# Patient Record
Sex: FEMALE | Race: BLACK OR AFRICAN AMERICAN | NOT HISPANIC OR LATINO | Employment: FULL TIME | URBAN - METROPOLITAN AREA
[De-identification: names, ages, dates, MRNs, and addresses within clinical notes are randomized per-mention and may not be internally consistent; named-entity substitution may affect disease eponyms.]

---

## 2023-12-08 ENCOUNTER — TELEPHONE (OUTPATIENT)
Dept: OBSTETRICS AND GYNECOLOGY | Facility: HOSPITAL | Age: 28
End: 2023-12-08
Payer: MEDICAID

## 2023-12-13 NOTE — TELEPHONE ENCOUNTER
Patient called office for prescription request  Called patient back to follow up, vm box not set up, tried contacting patient again and phone number was out of service  Not connected with MyChart  Will await to here back from patient  Flora Crawford RN

## 2024-10-20 ENCOUNTER — HOSPITAL ENCOUNTER (EMERGENCY)
Facility: HOSPITAL | Age: 29
Discharge: HOME | End: 2024-10-20
Attending: EMERGENCY MEDICINE
Payer: MEDICAID

## 2024-10-20 VITALS
SYSTOLIC BLOOD PRESSURE: 107 MMHG | TEMPERATURE: 97.3 F | HEART RATE: 55 BPM | DIASTOLIC BLOOD PRESSURE: 78 MMHG | WEIGHT: 120 LBS | RESPIRATION RATE: 16 BRPM | BODY MASS INDEX: 23.56 KG/M2 | HEIGHT: 60 IN | OXYGEN SATURATION: 98 %

## 2024-10-20 DIAGNOSIS — F10.920 ALCOHOLIC INTOXICATION WITHOUT COMPLICATION (CMS-HCC): Primary | ICD-10-CM

## 2024-10-20 LAB
ALBUMIN SERPL BCP-MCNC: 3.7 G/DL (ref 3.4–5)
ALP SERPL-CCNC: 41 U/L (ref 33–110)
ALT SERPL W P-5'-P-CCNC: 13 U/L (ref 7–45)
ANION GAP SERPL CALC-SCNC: 19 MMOL/L (ref 10–20)
AST SERPL W P-5'-P-CCNC: 27 U/L (ref 9–39)
BASOPHILS # BLD AUTO: 0.04 X10*3/UL (ref 0–0.1)
BASOPHILS NFR BLD AUTO: 0.5 %
BILIRUB SERPL-MCNC: 0.4 MG/DL (ref 0–1.2)
BUN SERPL-MCNC: 10 MG/DL (ref 6–23)
CALCIUM SERPL-MCNC: 8.1 MG/DL (ref 8.6–10.6)
CHLORIDE SERPL-SCNC: 107 MMOL/L (ref 98–107)
CO2 SERPL-SCNC: 18 MMOL/L (ref 21–32)
CREAT SERPL-MCNC: 0.81 MG/DL (ref 0.5–1.05)
EGFRCR SERPLBLD CKD-EPI 2021: >90 ML/MIN/1.73M*2
EOSINOPHIL # BLD AUTO: 0.09 X10*3/UL (ref 0–0.7)
EOSINOPHIL NFR BLD AUTO: 1.2 %
ERYTHROCYTE [DISTWIDTH] IN BLOOD BY AUTOMATED COUNT: 14.3 % (ref 11.5–14.5)
ETHANOL SERPL-MCNC: 124 MG/DL
GLUCOSE BLD MANUAL STRIP-MCNC: 123 MG/DL (ref 74–99)
GLUCOSE SERPL-MCNC: 60 MG/DL (ref 74–99)
HCT VFR BLD AUTO: 37.1 % (ref 36–46)
HGB BLD-MCNC: 11.2 G/DL (ref 12–16)
IMM GRANULOCYTES # BLD AUTO: 0.02 X10*3/UL (ref 0–0.7)
IMM GRANULOCYTES NFR BLD AUTO: 0.3 % (ref 0–0.9)
LYMPHOCYTES # BLD AUTO: 2.56 X10*3/UL (ref 1.2–4.8)
LYMPHOCYTES NFR BLD AUTO: 33.2 %
MCH RBC QN AUTO: 30.9 PG (ref 26–34)
MCHC RBC AUTO-ENTMCNC: 30.2 G/DL (ref 32–36)
MCV RBC AUTO: 102 FL (ref 80–100)
MONOCYTES # BLD AUTO: 0.48 X10*3/UL (ref 0.1–1)
MONOCYTES NFR BLD AUTO: 6.2 %
NEUTROPHILS # BLD AUTO: 4.52 X10*3/UL (ref 1.2–7.7)
NEUTROPHILS NFR BLD AUTO: 58.6 %
NRBC BLD-RTO: 0 /100 WBCS (ref 0–0)
PLATELET # BLD AUTO: 227 X10*3/UL (ref 150–450)
POTASSIUM SERPL-SCNC: 3.3 MMOL/L (ref 3.5–5.3)
PROT SERPL-MCNC: 6.5 G/DL (ref 6.4–8.2)
RBC # BLD AUTO: 3.63 X10*6/UL (ref 4–5.2)
SODIUM SERPL-SCNC: 141 MMOL/L (ref 136–145)
WBC # BLD AUTO: 7.7 X10*3/UL (ref 4.4–11.3)

## 2024-10-20 PROCEDURE — 85025 COMPLETE CBC W/AUTO DIFF WBC: CPT

## 2024-10-20 PROCEDURE — 2500000004 HC RX 250 GENERAL PHARMACY W/ HCPCS (ALT 636 FOR OP/ED): Mod: SE

## 2024-10-20 PROCEDURE — 82077 ASSAY SPEC XCP UR&BREATH IA: CPT

## 2024-10-20 PROCEDURE — 99284 EMERGENCY DEPT VISIT MOD MDM: CPT | Mod: 25

## 2024-10-20 PROCEDURE — 36415 COLL VENOUS BLD VENIPUNCTURE: CPT

## 2024-10-20 PROCEDURE — 80053 COMPREHEN METABOLIC PANEL: CPT

## 2024-10-20 PROCEDURE — 82947 ASSAY GLUCOSE BLOOD QUANT: CPT

## 2024-10-20 PROCEDURE — 96374 THER/PROPH/DIAG INJ IV PUSH: CPT

## 2024-10-20 PROCEDURE — 99284 EMERGENCY DEPT VISIT MOD MDM: CPT | Performed by: EMERGENCY MEDICINE

## 2024-10-20 PROCEDURE — 2500000002 HC RX 250 W HCPCS SELF ADMINISTERED DRUGS (ALT 637 FOR MEDICARE OP, ALT 636 FOR OP/ED): Mod: SE | Performed by: EMERGENCY MEDICINE

## 2024-10-20 RX ORDER — ONDANSETRON HYDROCHLORIDE 2 MG/ML
4 INJECTION, SOLUTION INTRAVENOUS ONCE
Status: COMPLETED | OUTPATIENT
Start: 2024-10-20 | End: 2024-10-20

## 2024-10-20 RX ORDER — POTASSIUM CHLORIDE 20 MEQ/1
40 TABLET, EXTENDED RELEASE ORAL ONCE
Status: COMPLETED | OUTPATIENT
Start: 2024-10-20 | End: 2024-10-20

## 2024-10-20 ASSESSMENT — LIFESTYLE VARIABLES
HAVE PEOPLE ANNOYED YOU BY CRITICIZING YOUR DRINKING: NO
TOTAL SCORE: 0
EVER FELT BAD OR GUILTY ABOUT YOUR DRINKING: NO
EVER HAD A DRINK FIRST THING IN THE MORNING TO STEADY YOUR NERVES TO GET RID OF A HANGOVER: NO
HAVE YOU EVER FELT YOU SHOULD CUT DOWN ON YOUR DRINKING: NO

## 2024-10-20 ASSESSMENT — COLUMBIA-SUICIDE SEVERITY RATING SCALE - C-SSRS
1. IN THE PAST MONTH, HAVE YOU WISHED YOU WERE DEAD OR WISHED YOU COULD GO TO SLEEP AND NOT WAKE UP?: NO
6. HAVE YOU EVER DONE ANYTHING, STARTED TO DO ANYTHING, OR PREPARED TO DO ANYTHING TO END YOUR LIFE?: NO
2. HAVE YOU ACTUALLY HAD ANY THOUGHTS OF KILLING YOURSELF?: NO

## 2024-10-20 ASSESSMENT — PAIN - FUNCTIONAL ASSESSMENT: PAIN_FUNCTIONAL_ASSESSMENT: 0-10

## 2024-10-20 ASSESSMENT — PAIN SCALES - GENERAL: PAINLEVEL_OUTOF10: 0 - NO PAIN

## 2024-10-20 NOTE — ED PROVIDER NOTES
HPI   No chief complaint on file.      HPI   Patient is a 28-year-old female with no significant past medical history who presents to the emergency department via EMS for hypotension and alcohol intoxication.  Patient states that she called EMS herself after she became wobbly on her feet and want to come into the emergency department as she had to work today.  Patient states that she drank a bottle of tequila overnight and smoked some marijuana 3 to 4 hours prior to presentation.  EMS states that patient was hypotensive during transport to the emergency department and was started on fluids.  They  state that the patient was responsive to the fluids and her blood pressure normalized prior to arrival.  They also state the patient vomited twice andwas bradycardic in the 50s but otherwise stable and cooperative.  Patient states that she has no other medical issues and is on no medication at baseline and denies any allergies.  Patient states that she is still a little nauseous and does not feel like she could walk steadily yet.  However, she denies chest pain, shortness of breath, headache, fevers, change in urination bowel habits.      Patient History   Past Medical History:   Diagnosis Date    Postpartum depression 03/27/2019    Depressed mood with postpartum onset     Past Surgical History:   Procedure Laterality Date    OTHER SURGICAL HISTORY  03/27/2019    No history of surgery     No family history on file.  Social History     Tobacco Use    Smoking status: Not on file    Smokeless tobacco: Not on file   Substance Use Topics    Alcohol use: Not on file    Drug use: Not on file       Physical Exam   ED Triage Vitals   Temp Pulse Resp BP   -- -- -- --      SpO2 Temp src Heart Rate Source Patient Position   -- -- -- --      BP Location FiO2 (%)     -- --       Physical Exam  Vitals and nursing note reviewed.   Constitutional:       General: She is not in acute distress.     Appearance: She is well-developed.   HENT:       Head: Normocephalic and atraumatic.   Eyes:      Conjunctiva/sclera: Conjunctivae normal.   Cardiovascular:      Rate and Rhythm: Normal rate and regular rhythm.      Heart sounds: No murmur heard.  Pulmonary:      Effort: Pulmonary effort is normal. No respiratory distress.      Breath sounds: Normal breath sounds.   Abdominal:      Palpations: Abdomen is soft.      Tenderness: There is no abdominal tenderness.   Musculoskeletal:         General: No swelling.      Cervical back: Neck supple.   Skin:     General: Skin is warm and dry.      Capillary Refill: Capillary refill takes less than 2 seconds.   Neurological:      Mental Status: She is alert.   Psychiatric:         Mood and Affect: Mood normal.     ED Course & MDM   Diagnoses as of 10/20/24 1043   Alcoholic intoxication without complication (CMS-[facility])     Medical Decision Making  Patient is a 28-year-old female with no significant past medical history who presents to the emergency department via EMS for hypotension and alcohol intoxication.  Patient was bradycardic with a heart rate of 48 bpm with other vital stable within normal limits.  Patient CBC showed no evidence of leukocytosis with a WBC of 7.7 and a slight anemia with hemoglobin of 11.2.  Patient CMP showed potassium to be slightly low at 3.3, likely due to multiple bouts of emesis.  Patient was noted to be hypoglycemic at 60.  Patient given thiamine 500 mg and tolerated oral intake.  Patient's repeat glucose resulted at 123.  Upon reevaluation at 1430, patient was sober and had tolerated p.o. food and drink.  Patient also able to ambulate to the bathroom and back and around her room without difficulty.  Patient states that she can arrange for a safe ride home.  Patient discharged in good condition with strict return precautions.  Patient understood and was agreeable with the plan.    Procedure  Procedures none     Dimitry Davis,   Resident  10/20/24 5775

## 2024-10-20 NOTE — DISCHARGE INSTRUCTIONS
You were seen in the [facility] apartment today following alcohol intoxication.  You are safely discharged at this time.  However, should you have any new, worsening, or concerning symptoms please prepacked the emergency department.

## 2024-10-20 NOTE — ED TRIAGE NOTES
"Pt presented to the ed with complaints of inability to see clearly, as well as having difficulty with her motor movements.  Per the patient she had been drinking heavily overnight with a friend of hers, and that she had too much.  She called EMS when she could not function properly at home, as well as she states \" I had to do something because I'm supposed to be at work\"  She is otherwise in stable condition, no LOC or visualized traumatic injuries.    "

## 2025-06-08 ENCOUNTER — APPOINTMENT (OUTPATIENT)
Dept: URGENT CARE | Age: 30
End: 2025-06-08
Payer: MEDICAID

## 2025-08-03 ENCOUNTER — HOSPITAL ENCOUNTER (EMERGENCY)
Facility: HOSPITAL | Age: 30
Discharge: HOME | End: 2025-08-03
Attending: EMERGENCY MEDICINE
Payer: COMMERCIAL

## 2025-08-03 VITALS
HEART RATE: 60 BPM | TEMPERATURE: 97.6 F | DIASTOLIC BLOOD PRESSURE: 76 MMHG | OXYGEN SATURATION: 99 % | RESPIRATION RATE: 16 BRPM | SYSTOLIC BLOOD PRESSURE: 121 MMHG

## 2025-08-03 DIAGNOSIS — B96.89 BACTERIAL VAGINOSIS: ICD-10-CM

## 2025-08-03 DIAGNOSIS — N76.0 BACTERIAL VAGINOSIS: ICD-10-CM

## 2025-08-03 DIAGNOSIS — T78.40XA ALLERGIC REACTION, INITIAL ENCOUNTER: Primary | ICD-10-CM

## 2025-08-03 LAB
ANION GAP BLDV CALCULATED.4IONS-SCNC: 12 MMOL/L (ref 10–25)
APPEARANCE UR: CLEAR
BASE EXCESS BLDV CALC-SCNC: 2 MMOL/L (ref -2–3)
BILIRUB UR STRIP.AUTO-MCNC: NEGATIVE MG/DL
BODY TEMPERATURE: 37 DEGREES CELSIUS
CA-I BLDV-SCNC: 1.17 MMOL/L (ref 1.1–1.33)
CHLORIDE BLDV-SCNC: 102 MMOL/L (ref 98–107)
CLUE CELLS SPEC QL WET PREP: PRESENT
COLOR UR: YELLOW
GLUCOSE BLDV-MCNC: 86 MG/DL (ref 74–99)
GLUCOSE UR STRIP.AUTO-MCNC: NORMAL MG/DL
HAV IGM SER QL: NONREACTIVE
HBV CORE IGM SER QL: NONREACTIVE
HBV SURFACE AG SERPL QL IA: NONREACTIVE
HCO3 BLDV-SCNC: 27.8 MMOL/L (ref 22–26)
HCT VFR BLD EST: 38 % (ref 36–46)
HCV AB SER QL: NONREACTIVE
HGB BLDV-MCNC: 12.7 G/DL (ref 12–16)
HIV 1+2 AB+HIV1 P24 AG SERPL QL IA: NONREACTIVE
KETONES UR STRIP.AUTO-MCNC: NEGATIVE MG/DL
LACTATE BLDV-SCNC: 1.2 MMOL/L (ref 0.4–2)
LEUKOCYTE ESTERASE UR QL STRIP.AUTO: NEGATIVE
MUCOUS THREADS #/AREA URNS AUTO: NORMAL /LPF
NITRITE UR QL STRIP.AUTO: NEGATIVE
OXYHGB MFR BLDV: 62.2 % (ref 45–75)
PCO2 BLDV: 47 MM HG (ref 41–51)
PH BLDV: 7.38 PH (ref 7.33–7.43)
PH UR STRIP.AUTO: 7 [PH]
PO2 BLDV: 38 MM HG (ref 35–45)
POTASSIUM BLDV-SCNC: 4 MMOL/L (ref 3.5–5.3)
PROT UR STRIP.AUTO-MCNC: ABNORMAL MG/DL
RBC # UR STRIP.AUTO: NEGATIVE MG/DL
RBC #/AREA URNS AUTO: NORMAL /HPF
SAO2 % BLDV: 64 % (ref 45–75)
SODIUM BLDV-SCNC: 138 MMOL/L (ref 136–145)
SP GR UR STRIP.AUTO: 1.03
SQUAMOUS #/AREA URNS AUTO: NORMAL /HPF
T VAGINALIS SPEC QL WET PREP: ABNORMAL
TRICHOMONAS REFLEX COMMENT: ABNORMAL
UROBILINOGEN UR STRIP.AUTO-MCNC: NORMAL MG/DL
WBC #/AREA URNS AUTO: NORMAL /HPF
WBC VAG QL WET PREP: ABNORMAL
YEAST VAG QL WET PREP: ABNORMAL

## 2025-08-03 PROCEDURE — 96374 THER/PROPH/DIAG INJ IV PUSH: CPT

## 2025-08-03 PROCEDURE — 87389 HIV-1 AG W/HIV-1&-2 AB AG IA: CPT

## 2025-08-03 PROCEDURE — 96375 TX/PRO/DX INJ NEW DRUG ADDON: CPT

## 2025-08-03 PROCEDURE — 87661 TRICHOMONAS VAGINALIS AMPLIF: CPT

## 2025-08-03 PROCEDURE — 99284 EMERGENCY DEPT VISIT MOD MDM: CPT | Performed by: EMERGENCY MEDICINE

## 2025-08-03 PROCEDURE — 87491 CHLMYD TRACH DNA AMP PROBE: CPT

## 2025-08-03 PROCEDURE — 2500000004 HC RX 250 GENERAL PHARMACY W/ HCPCS (ALT 636 FOR OP/ED): Mod: JZ,SE | Performed by: EMERGENCY MEDICINE

## 2025-08-03 PROCEDURE — 84132 ASSAY OF SERUM POTASSIUM: CPT

## 2025-08-03 PROCEDURE — 81001 URINALYSIS AUTO W/SCOPE: CPT

## 2025-08-03 PROCEDURE — 86780 TREPONEMA PALLIDUM: CPT

## 2025-08-03 PROCEDURE — 2500000001 HC RX 250 WO HCPCS SELF ADMINISTERED DRUGS (ALT 637 FOR MEDICARE OP)

## 2025-08-03 PROCEDURE — 36415 COLL VENOUS BLD VENIPUNCTURE: CPT

## 2025-08-03 PROCEDURE — 80074 ACUTE HEPATITIS PANEL: CPT

## 2025-08-03 PROCEDURE — 87210 SMEAR WET MOUNT SALINE/INK: CPT

## 2025-08-03 RX ORDER — METRONIDAZOLE 500 MG/1
500 TABLET ORAL 2 TIMES DAILY
Qty: 13 TABLET | Refills: 0 | Status: SHIPPED | OUTPATIENT
Start: 2025-08-03 | End: 2025-08-10

## 2025-08-03 RX ORDER — METRONIDAZOLE 500 MG/1
500 TABLET ORAL ONCE
Status: COMPLETED | OUTPATIENT
Start: 2025-08-03 | End: 2025-08-03

## 2025-08-03 RX ORDER — METRONIDAZOLE 500 MG/1
500 TABLET ORAL 2 TIMES DAILY
Qty: 13 TABLET | Refills: 0 | Status: SHIPPED | OUTPATIENT
Start: 2025-08-03 | End: 2025-08-03

## 2025-08-03 RX ORDER — FAMOTIDINE 10 MG/ML
20 INJECTION, SOLUTION INTRAVENOUS ONCE
Status: COMPLETED | OUTPATIENT
Start: 2025-08-03 | End: 2025-08-03

## 2025-08-03 RX ADMIN — METHYLPREDNISOLONE SODIUM SUCCINATE 125 MG: 125 INJECTION, POWDER, FOR SOLUTION INTRAMUSCULAR; INTRAVENOUS at 18:07

## 2025-08-03 RX ADMIN — METRONIDAZOLE 500 MG: 500 TABLET, FILM COATED ORAL at 22:16

## 2025-08-03 RX ADMIN — FAMOTIDINE 20 MG: 10 INJECTION INTRAVENOUS at 18:07

## 2025-08-03 ASSESSMENT — LIFESTYLE VARIABLES
EVER HAD A DRINK FIRST THING IN THE MORNING TO STEADY YOUR NERVES TO GET RID OF A HANGOVER: NO
HAVE YOU EVER FELT YOU SHOULD CUT DOWN ON YOUR DRINKING: NO
HAVE PEOPLE ANNOYED YOU BY CRITICIZING YOUR DRINKING: NO
EVER FELT BAD OR GUILTY ABOUT YOUR DRINKING: NO
TOTAL SCORE: 0

## 2025-08-03 ASSESSMENT — PAIN SCALES - GENERAL
PAINLEVEL_OUTOF10: 0 - NO PAIN
PAINLEVEL_OUTOF10: 0 - NO PAIN

## 2025-08-03 ASSESSMENT — PAIN - FUNCTIONAL ASSESSMENT
PAIN_FUNCTIONAL_ASSESSMENT: 0-10
PAIN_FUNCTIONAL_ASSESSMENT: 0-10

## 2025-08-03 NOTE — ED PROVIDER NOTES
Emergency Department Provider Note        History of Present Illness     History provided by: Patient  Limitations to History: None  External Records Reviewed with Brief Summary: None    HPI:  Amanda Hooks is a 29 y.o. female with no past medical history presents to the emergency department for concern for allergic reaction. She reports an hour before arriving to the emergency department around 5 PM, she ate a burger and started having hives and itchiness. Per EMS, was given IM Benadryl. Patient reports symptoms are improved and itchiness improved. Patient denies fever, nausea, vomiting, shortness of breath,  chest pain, abdominal pain. She reports she has a primary care doctor to follow-up with.    Physical Exam   Triage vitals:  T 36.4 °C (97.6 °F)  HR 58  /74  RR 16  O2 99 % None (Room air)    General: Awake, alert, in no acute distress  Eyes: Gaze conjugate.  No scleral icterus or injection  HENT: Normo-cephalic, atraumatic. No stridor, no oropharyngeal edema, posterior oropharynx is not erythematous, no submandibular swelling, no stridor  CV: Regular rate, regular rhythm. Radial pulses 2+ bilaterally  Resp: Breathing non-labored, speaking in full sentences.  Clear to auscultation bilaterally  GI: Soft, non-distended, non-tender. No rebound or guarding.  MSK/Extremities: No gross bony deformities. Moving all extremities  Skin: Warm. Appropriate color  Neuro: Alert. Oriented. Face symmetric. Speech is fluent.  Gross strength and sensation intact in b/l UE and LEs  Psych: Appropriate mood and affect    Medical Decision Making & ED Course   Medical Decision Makin y.o. female presents with symptoms consistent with acute hypersensitivity reaction, likely acute allergic reaction. Presentation not consistent with acute anaphylaxis (lack of pulmonary, dermatologic, cardiovascular or GI symptoms, lack of hypotension or exposure to known allergen), angioedema, serum sickness (no recent drug exposure,  lacks fevers, arthralgias). No evidence of airway compromise or shock at this time. Patient improved with H1/H2 blockers, steroids. No need for epinephrine. VFP normal. Patient observed here. Patient signed out in stable condition.    ----  Differential diagnoses considered include but are not limited to: Allergic reaction, anaphylaxis, contact dermatitis     Social Determinants of Health which Significantly Impact Care: None identified     EKG Independent Interpretation: EKG not obtained    Independent Result Review and Interpretation: Relevant laboratory and radiographic results were reviewed and independently interpreted by myself.  As necessary, they are commented on in the ED Course.    Chronic conditions affecting the patient's care: As documented above in MDM    The patient was discussed with the following consultants/services: None    Care Considerations: As documented above in [facility]    ED Course:  Diagnoses as of 08/03/25 1823   Allergic reaction, initial encounter     Disposition   Patient was signed out to Dr. Al at 1900 pending completion of their work-up.  Please see the next provider's transition of care note for the remainder of the patient's care.     Procedures   Procedures    Patient seen and discussed with ED attending physician.    Natasha Tanner MD  Emergency Medicine     Natasha Tanner MD  Resident  08/03/25 1823

## 2025-08-03 NOTE — ED TRIAGE NOTES
Pt presents to ED after an allergic reaction. Pt states she was eating at a restaurant and had a burger and fries. Pt states when she got home she noticed hives on bilat UE along with her face. Pt also noticed she became itchy. Pt called EMS. EMS gave py 50mg IM benadryl after pt stated her throat became itchy. After benadryl, pt's hives went away. Pt denies difficulty swallowing or talking. Pt speaking in full sentences. Pt endorses itchiness, but states other symptoms have subsided. Pt denies CP/SOB/n/v

## 2025-08-03 NOTE — DISCHARGE INSTRUCTIONS
Please follow up with your primary care doctor.    Return if you have:  Persistent nausea and vomiting  Severe uncontrolled pain  Difficulty breathing  Chest pain  Swelling   Worsened itchiness and skin rash  Any other questions or concerns you may have after discharge    In an emergency, call 911 or go to an Emergency Department at a nearby hospital     You had positive clue cells on your swabs which is concerning for bacterial vaginosis. Your other tests are pending. You should take the medication, Flagyl, twice daily for the next 7 days. You shoul dreceive a phone call if any of the remaining tests are positive.

## 2025-08-03 NOTE — PROGRESS NOTES
Emergency Department Transition of Care Note       Signout   I received Amanda Hooks in signout from Dr. Tanner.  Please see the ED Provider Note for all HPI, PE and MDM up to the time of signout at 1900.  This is in addition to the primary record.    In brief Amanda Hooks is an 29 y.o. female presenting for evaluation of allergic reaction that occurred after eating a burger. Patient was given benadryl prior to arrival via EMS and additionally received solumedrol and prednisone here in the ED. Patient was experiencing hives and itching prior to arrival. Patient pending observation period to ensure no recurrance of symptoms prior to discharge, planned for 9:30pm.     At the time of signout we were awaiting:  Reevaluation at end of observation period   Final disposition    ED Course & Medical Decision Making   Medical Decision Making:  Under my care, patient was reevaluated during observation period for allergic reaction. Patient endorses she has been having some increasing vaginal discharge and requests testing for STIs. She would not like to be treated prophylactically. Patient offered pelvic examination but preferred self swab. She had no abdominal tenderness on palpation. Urinalysis revealed no evidence of infection. Wet prep revealed presence of Clue cells with no trichomonas or yeast seen. Discussed with patient the results of the testing and initiated on Flagyl. Patient instructed to continue taking as prescribed over the next seven days. Patient informed she will be  notified if any additional testing comes back positive and if she needs to  additional medications or return to the ED. Patient endorses no symptoms following her period of observation and is comfortable returning home. Patient discharged home in stable condition.     ED Course:  Diagnoses as of 08/06/25 1833   Allergic reaction, initial encounter   Bacterial vaginosis       Disposition   As a result of the work-up, the patient was  discharged home.  she was informed of her diagnosis and instructed to come back with any concerns or worsening of condition.  she and was agreeable to the plan as discussed above.  she was given the opportunity to ask questions.  All of the patient's questions were answered.    Procedures   Procedures    Patient seen and discussed with ED attending physician.    Liliana Patel, DO  Emergency Medicine

## 2025-08-04 LAB
C TRACH RRNA SPEC QL NAA+PROBE: NEGATIVE
HOLD SPECIMEN: NORMAL
N GONORRHOEA DNA SPEC QL PROBE+SIG AMP: NEGATIVE
T VAGINALIS RRNA SPEC QL NAA+PROBE: NEGATIVE
TREPONEMA PALLIDUM IGG+IGM AB [PRESENCE] IN SERUM OR PLASMA BY IMMUNOASSAY: NONREACTIVE